# Patient Record
Sex: FEMALE | Race: WHITE | NOT HISPANIC OR LATINO | Employment: OTHER | URBAN - METROPOLITAN AREA
[De-identification: names, ages, dates, MRNs, and addresses within clinical notes are randomized per-mention and may not be internally consistent; named-entity substitution may affect disease eponyms.]

---

## 2017-06-08 ENCOUNTER — UNSCHEDULED FOLLOW UP (OUTPATIENT)
Dept: URBAN - METROPOLITAN AREA CLINIC 26 | Facility: CLINIC | Age: 62
End: 2017-06-08

## 2017-06-08 DIAGNOSIS — H35.341: ICD-10-CM

## 2017-06-08 DIAGNOSIS — H44.22: ICD-10-CM

## 2017-06-08 DIAGNOSIS — H43.813: ICD-10-CM

## 2017-06-08 DIAGNOSIS — H43.392: ICD-10-CM

## 2017-06-08 DIAGNOSIS — H35.3231: ICD-10-CM

## 2017-06-08 DIAGNOSIS — H33.301: ICD-10-CM

## 2017-06-08 PROCEDURE — 92235 FLUORESCEIN ANGRPH MLTIFRAME: CPT

## 2017-06-08 PROCEDURE — 92014 COMPRE OPH EXAM EST PT 1/>: CPT

## 2017-06-08 PROCEDURE — 92250 FUNDUS PHOTOGRAPHY W/I&R: CPT

## 2017-06-08 PROCEDURE — 67028 INJECTION EYE DRUG: CPT

## 2017-06-08 ASSESSMENT — TONOMETRY
OD_IOP_MMHG: 14
OS_IOP_MMHG: 16

## 2017-06-08 ASSESSMENT — VISUAL ACUITY
OS_CC: 20/25-1
OD_CC: 20/25

## 2017-06-12 ENCOUNTER — CLINIC PROCEDURE ONLY (OUTPATIENT)
Dept: URBAN - METROPOLITAN AREA CLINIC 26 | Facility: CLINIC | Age: 62
End: 2017-06-12

## 2017-06-12 DIAGNOSIS — H35.3231: ICD-10-CM

## 2017-06-12 PROCEDURE — 67028 INJECTION EYE DRUG: CPT

## 2017-06-12 ASSESSMENT — TONOMETRY: OS_IOP_MMHG: 15

## 2017-06-12 ASSESSMENT — VISUAL ACUITY: OS_CC: 20/30+2

## 2017-06-21 ENCOUNTER — UNSCHEDULED FOLLOW UP (OUTPATIENT)
Dept: URBAN - METROPOLITAN AREA CLINIC 26 | Facility: CLINIC | Age: 62
End: 2017-06-21

## 2017-06-21 VITALS — HEIGHT: 55 IN | HEART RATE: 68 BPM | DIASTOLIC BLOOD PRESSURE: 76 MMHG | SYSTOLIC BLOOD PRESSURE: 120 MMHG

## 2017-06-21 DIAGNOSIS — H44.22: ICD-10-CM

## 2017-06-21 DIAGNOSIS — H35.341: ICD-10-CM

## 2017-06-21 DIAGNOSIS — H35.3231: ICD-10-CM

## 2017-06-21 DIAGNOSIS — H33.301: ICD-10-CM

## 2017-06-21 DIAGNOSIS — H43.392: ICD-10-CM

## 2017-06-21 DIAGNOSIS — H43.813: ICD-10-CM

## 2017-06-21 PROCEDURE — 92226 OPHTHALMOSCOPY (SUB): CPT

## 2017-06-21 PROCEDURE — 92134 CPTRZ OPH DX IMG PST SGM RTA: CPT

## 2017-06-21 PROCEDURE — 92014 COMPRE OPH EXAM EST PT 1/>: CPT

## 2017-06-21 ASSESSMENT — VISUAL ACUITY
OS_CC: 20/30+2
OD_CC: 20/25-2

## 2017-06-21 ASSESSMENT — TONOMETRY
OS_IOP_MMHG: 13
OD_IOP_MMHG: 11

## 2017-07-21 ENCOUNTER — APPOINTMENT (RX ONLY)
Dept: URBAN - METROPOLITAN AREA CLINIC 116 | Facility: CLINIC | Age: 62
Setting detail: DERMATOLOGY
End: 2017-07-21

## 2017-07-21 DIAGNOSIS — L60.3 NAIL DYSTROPHY: ICD-10-CM

## 2017-07-21 DIAGNOSIS — L65.0 TELOGEN EFFLUVIUM: ICD-10-CM

## 2017-07-21 DIAGNOSIS — D18.0 HEMANGIOMA: ICD-10-CM

## 2017-07-21 DIAGNOSIS — D17 BENIGN LIPOMATOUS NEOPLASM: ICD-10-CM

## 2017-07-21 PROBLEM — D18.01 HEMANGIOMA OF SKIN AND SUBCUTANEOUS TISSUE: Status: ACTIVE | Noted: 2017-07-21

## 2017-07-21 PROBLEM — D17.24 BENIGN LIPOMATOUS NEOPLASM OF SKIN AND SUBCUTANEOUS TISSUE OF LEFT LEG: Status: ACTIVE | Noted: 2017-07-21

## 2017-07-21 PROCEDURE — ? TREATMENT REGIMEN

## 2017-07-21 PROCEDURE — 99214 OFFICE O/P EST MOD 30 MIN: CPT

## 2017-07-21 PROCEDURE — ? COUNSELING

## 2017-07-21 ASSESSMENT — LOCATION ZONE DERM
LOCATION ZONE: LEG
LOCATION ZONE: SCALP
LOCATION ZONE: TRUNK
LOCATION ZONE: FINGERNAIL

## 2017-07-21 ASSESSMENT — LOCATION SIMPLE DESCRIPTION DERM
LOCATION SIMPLE: LEFT INDEX FINGERNAIL
LOCATION SIMPLE: LEFT THIGH
LOCATION SIMPLE: RIGHT SMALL FINGERNAIL
LOCATION SIMPLE: ABDOMEN
LOCATION SIMPLE: LEFT SMALL FINGERNAIL
LOCATION SIMPLE: RIGHT INDEX FINGERNAIL
LOCATION SIMPLE: RIGHT RING FINGERNAIL
LOCATION SIMPLE: LEFT RING FINGERNAIL
LOCATION SIMPLE: RIGHT THUMBNAIL
LOCATION SIMPLE: LEFT THUMBNAIL
LOCATION SIMPLE: RIGHT SCALP
LOCATION SIMPLE: RIGHT MIDDLE FINGERNAIL
LOCATION SIMPLE: LEFT MIDDLE FINGERNAIL

## 2017-07-21 ASSESSMENT — LOCATION DETAILED DESCRIPTION DERM
LOCATION DETAILED: RIGHT THUMBNAIL
LOCATION DETAILED: LEFT MIDDLE FINGERNAIL
LOCATION DETAILED: LEFT RING FINGER LUNULA
LOCATION DETAILED: LEFT ANTERIOR PROXIMAL THIGH
LOCATION DETAILED: RIGHT INDEX FINGERNAIL
LOCATION DETAILED: RIGHT MEDIAL FRONTAL SCALP
LOCATION DETAILED: RIGHT SMALL FINGERNAIL
LOCATION DETAILED: LEFT SMALL FINGERNAIL
LOCATION DETAILED: PERIUMBILICAL SKIN
LOCATION DETAILED: RIGHT MIDDLE FINGERNAIL
LOCATION DETAILED: LEFT INDEX FINGERNAIL
LOCATION DETAILED: LEFT THUMBNAIL
LOCATION DETAILED: RIGHT RING FINGERNAIL

## 2017-07-21 NOTE — PROCEDURE: TREATMENT REGIMEN
Initiate Treatment: Start viviscal supplements as directed x3 months
Discontinue Regimen: Biotin
Detail Level: Zone
Initiate Treatment: Start aquaphor to nails daily.  Use clear nail polish

## 2017-08-15 ENCOUNTER — FOLLOW UP (OUTPATIENT)
Dept: URBAN - METROPOLITAN AREA CLINIC 26 | Facility: CLINIC | Age: 62
End: 2017-08-15

## 2017-08-15 VITALS
HEIGHT: 62 IN | SYSTOLIC BLOOD PRESSURE: 121 MMHG | WEIGHT: 143 LBS | DIASTOLIC BLOOD PRESSURE: 73 MMHG | HEART RATE: 71 BPM | BODY MASS INDEX: 26.31 KG/M2

## 2017-08-15 DIAGNOSIS — H44.22: ICD-10-CM

## 2017-08-15 DIAGNOSIS — H35.3231: ICD-10-CM

## 2017-08-15 DIAGNOSIS — H43.813: ICD-10-CM

## 2017-08-15 DIAGNOSIS — H43.392: ICD-10-CM

## 2017-08-15 DIAGNOSIS — H35.341: ICD-10-CM

## 2017-08-15 DIAGNOSIS — H33.301: ICD-10-CM

## 2017-08-15 PROCEDURE — 67028 INJECTION EYE DRUG: CPT

## 2017-08-15 PROCEDURE — 92014 COMPRE OPH EXAM EST PT 1/>: CPT

## 2017-08-15 PROCEDURE — 92235 FLUORESCEIN ANGRPH MLTIFRAME: CPT

## 2017-08-15 PROCEDURE — 92250 FUNDUS PHOTOGRAPHY W/I&R: CPT

## 2017-08-15 ASSESSMENT — VISUAL ACUITY
OS_CC: 20/25-2
OD_CC: 20/30+2

## 2017-08-15 ASSESSMENT — TONOMETRY
OS_IOP_MMHG: 15
OD_IOP_MMHG: 13

## 2017-08-22 ENCOUNTER — CLINIC PROCEDURE ONLY (OUTPATIENT)
Dept: URBAN - METROPOLITAN AREA CLINIC 26 | Facility: CLINIC | Age: 62
End: 2017-08-22

## 2017-08-22 DIAGNOSIS — H35.3231: ICD-10-CM

## 2017-08-22 PROCEDURE — 67028 INJECTION EYE DRUG: CPT

## 2017-08-22 ASSESSMENT — VISUAL ACUITY: OD_CC: 20/30-

## 2017-08-22 ASSESSMENT — TONOMETRY: OD_IOP_MMHG: 18

## 2017-11-15 ENCOUNTER — FOLLOW UP AND POST INJECTION EVALUATION (OUTPATIENT)
Dept: URBAN - METROPOLITAN AREA CLINIC 26 | Facility: CLINIC | Age: 62
End: 2017-11-15

## 2017-11-15 VITALS — DIASTOLIC BLOOD PRESSURE: 79 MMHG | HEIGHT: 55 IN | SYSTOLIC BLOOD PRESSURE: 124 MMHG | HEART RATE: 67 BPM

## 2017-11-15 DIAGNOSIS — H43.392: ICD-10-CM

## 2017-11-15 DIAGNOSIS — H35.341: ICD-10-CM

## 2017-11-15 DIAGNOSIS — H44.22: ICD-10-CM

## 2017-11-15 DIAGNOSIS — H35.3231: ICD-10-CM

## 2017-11-15 DIAGNOSIS — H33.301: ICD-10-CM

## 2017-11-15 DIAGNOSIS — H43.813: ICD-10-CM

## 2017-11-15 PROCEDURE — 92014 COMPRE OPH EXAM EST PT 1/>: CPT

## 2017-11-15 PROCEDURE — 92235 FLUORESCEIN ANGRPH MLTIFRAME: CPT

## 2017-11-15 PROCEDURE — 92250 FUNDUS PHOTOGRAPHY W/I&R: CPT

## 2017-11-15 PROCEDURE — 92134 CPTRZ OPH DX IMG PST SGM RTA: CPT

## 2017-11-15 ASSESSMENT — TONOMETRY
OS_IOP_MMHG: 18
OD_IOP_MMHG: 18

## 2017-11-15 ASSESSMENT — VISUAL ACUITY
OS_CC: 20/25-1
OD_CC: 20/25

## 2017-11-30 ENCOUNTER — UNSCHEDULED FOLLOW UP (OUTPATIENT)
Dept: URBAN - METROPOLITAN AREA CLINIC 26 | Facility: CLINIC | Age: 62
End: 2017-11-30

## 2017-11-30 DIAGNOSIS — H43.392: ICD-10-CM

## 2017-11-30 DIAGNOSIS — H35.341: ICD-10-CM

## 2017-11-30 DIAGNOSIS — H43.813: ICD-10-CM

## 2017-11-30 DIAGNOSIS — H10.13: ICD-10-CM

## 2017-11-30 DIAGNOSIS — H33.301: ICD-10-CM

## 2017-11-30 DIAGNOSIS — H35.3231: ICD-10-CM

## 2017-11-30 DIAGNOSIS — H44.22: ICD-10-CM

## 2017-11-30 DIAGNOSIS — H18.59: ICD-10-CM

## 2017-11-30 PROCEDURE — 92014 COMPRE OPH EXAM EST PT 1/>: CPT

## 2017-11-30 RX ORDER — TOBRAMYCIN AND DEXAMETHASONE 1; 3 MG/ML; MG/ML: 1 SUSPENSION/ DROPS OPHTHALMIC

## 2017-11-30 ASSESSMENT — VISUAL ACUITY
OD_CC: 20/20-
OS_CC: 20/20-1

## 2017-11-30 ASSESSMENT — TONOMETRY
OS_IOP_MMHG: 19
OD_IOP_MMHG: 18

## 2018-02-07 ENCOUNTER — FOLLOW UP (OUTPATIENT)
Dept: URBAN - METROPOLITAN AREA CLINIC 26 | Facility: CLINIC | Age: 63
End: 2018-02-07

## 2018-02-07 VITALS — HEIGHT: 55 IN | HEART RATE: 71 BPM | DIASTOLIC BLOOD PRESSURE: 70 MMHG | SYSTOLIC BLOOD PRESSURE: 117 MMHG

## 2018-02-07 DIAGNOSIS — H18.59: ICD-10-CM

## 2018-02-07 DIAGNOSIS — H43.392: ICD-10-CM

## 2018-02-07 DIAGNOSIS — H35.341: ICD-10-CM

## 2018-02-07 DIAGNOSIS — H35.3231: ICD-10-CM

## 2018-02-07 DIAGNOSIS — H43.813: ICD-10-CM

## 2018-02-07 DIAGNOSIS — H44.22: ICD-10-CM

## 2018-02-07 DIAGNOSIS — H04.123: ICD-10-CM

## 2018-02-07 DIAGNOSIS — H53.412: ICD-10-CM

## 2018-02-07 DIAGNOSIS — H33.301: ICD-10-CM

## 2018-02-07 PROCEDURE — 92014 COMPRE OPH EXAM EST PT 1/>: CPT

## 2018-02-07 PROCEDURE — 92235 FLUORESCEIN ANGRPH MLTIFRAME: CPT

## 2018-02-07 PROCEDURE — 92250 FUNDUS PHOTOGRAPHY W/I&R: CPT

## 2018-02-07 PROCEDURE — 92083 EXTENDED VISUAL FIELD XM: CPT

## 2018-02-07 ASSESSMENT — TONOMETRY
OD_IOP_MMHG: 15
OS_IOP_MMHG: 17

## 2018-02-07 ASSESSMENT — VISUAL ACUITY
OD_CC: 20/20-2
OS_CC: 20/20-1

## 2018-03-27 ENCOUNTER — RX ONLY (OUTPATIENT)
Age: 63
Setting detail: RX ONLY
End: 2018-03-27

## 2018-03-27 RX ORDER — TRETINOIN 0.5 MG/G
CREAM TOPICAL
Qty: 1 | Refills: 0 | Status: ERX

## 2018-05-02 ENCOUNTER — FOLLOW UP (OUTPATIENT)
Dept: URBAN - METROPOLITAN AREA CLINIC 26 | Facility: CLINIC | Age: 63
End: 2018-05-02

## 2018-05-02 VITALS
SYSTOLIC BLOOD PRESSURE: 108 MMHG | DIASTOLIC BLOOD PRESSURE: 70 MMHG | BODY MASS INDEX: 26.31 KG/M2 | WEIGHT: 143 LBS | HEIGHT: 62 IN | HEART RATE: 63 BPM

## 2018-05-02 DIAGNOSIS — H35.3231: ICD-10-CM

## 2018-05-02 DIAGNOSIS — H35.341: ICD-10-CM

## 2018-05-02 DIAGNOSIS — H44.22: ICD-10-CM

## 2018-05-02 DIAGNOSIS — H43.392: ICD-10-CM

## 2018-05-02 DIAGNOSIS — H53.412: ICD-10-CM

## 2018-05-02 DIAGNOSIS — H33.301: ICD-10-CM

## 2018-05-02 DIAGNOSIS — H43.813: ICD-10-CM

## 2018-05-02 DIAGNOSIS — H04.123: ICD-10-CM

## 2018-05-02 DIAGNOSIS — H18.59: ICD-10-CM

## 2018-05-02 PROCEDURE — 92250 FUNDUS PHOTOGRAPHY W/I&R: CPT

## 2018-05-02 PROCEDURE — 92235 FLUORESCEIN ANGRPH MLTIFRAME: CPT

## 2018-05-02 PROCEDURE — 92134 CPTRZ OPH DX IMG PST SGM RTA: CPT

## 2018-05-02 PROCEDURE — 92014 COMPRE OPH EXAM EST PT 1/>: CPT

## 2018-05-02 ASSESSMENT — TONOMETRY
OD_IOP_MMHG: 18
OS_IOP_MMHG: 19

## 2018-05-02 ASSESSMENT — VISUAL ACUITY
OS_CC: 20/20
OD_CC: 20/25+2

## 2018-07-25 ENCOUNTER — FOLLOW UP (OUTPATIENT)
Dept: URBAN - METROPOLITAN AREA CLINIC 26 | Facility: CLINIC | Age: 63
End: 2018-07-25

## 2018-07-25 VITALS — SYSTOLIC BLOOD PRESSURE: 120 MMHG | HEIGHT: 55 IN | DIASTOLIC BLOOD PRESSURE: 70 MMHG

## 2018-07-25 DIAGNOSIS — H35.341: ICD-10-CM

## 2018-07-25 DIAGNOSIS — H33.301: ICD-10-CM

## 2018-07-25 DIAGNOSIS — H53.412: ICD-10-CM

## 2018-07-25 DIAGNOSIS — H44.22: ICD-10-CM

## 2018-07-25 DIAGNOSIS — H43.392: ICD-10-CM

## 2018-07-25 DIAGNOSIS — H35.3231: ICD-10-CM

## 2018-07-25 DIAGNOSIS — H18.59: ICD-10-CM

## 2018-07-25 DIAGNOSIS — H43.813: ICD-10-CM

## 2018-07-25 PROCEDURE — 92134 CPTRZ OPH DX IMG PST SGM RTA: CPT

## 2018-07-25 PROCEDURE — 92014 COMPRE OPH EXAM EST PT 1/>: CPT

## 2018-07-25 PROCEDURE — 92250 FUNDUS PHOTOGRAPHY W/I&R: CPT

## 2018-07-25 PROCEDURE — 92235 FLUORESCEIN ANGRPH MLTIFRAME: CPT

## 2018-07-25 ASSESSMENT — VISUAL ACUITY
OD_CC: 20/20-2
OS_CC: 20/20-2

## 2018-07-25 ASSESSMENT — TONOMETRY
OS_IOP_MMHG: 20
OD_IOP_MMHG: 19

## 2018-11-01 ENCOUNTER — FOLLOW UP (OUTPATIENT)
Dept: URBAN - METROPOLITAN AREA CLINIC 26 | Facility: CLINIC | Age: 63
End: 2018-11-01

## 2018-11-01 DIAGNOSIS — H43.813: ICD-10-CM

## 2018-11-01 DIAGNOSIS — H43.392: ICD-10-CM

## 2018-11-01 DIAGNOSIS — H53.412: ICD-10-CM

## 2018-11-01 DIAGNOSIS — H44.22: ICD-10-CM

## 2018-11-01 DIAGNOSIS — H04.123: ICD-10-CM

## 2018-11-01 DIAGNOSIS — H18.59: ICD-10-CM

## 2018-11-01 DIAGNOSIS — H35.3231: ICD-10-CM

## 2018-11-01 DIAGNOSIS — H35.341: ICD-10-CM

## 2018-11-01 DIAGNOSIS — H33.301: ICD-10-CM

## 2018-11-01 DIAGNOSIS — H35.372: ICD-10-CM

## 2018-11-01 PROCEDURE — 92014 COMPRE OPH EXAM EST PT 1/>: CPT

## 2018-11-01 PROCEDURE — 92134 CPTRZ OPH DX IMG PST SGM RTA: CPT

## 2018-11-01 PROCEDURE — 92250 FUNDUS PHOTOGRAPHY W/I&R: CPT

## 2018-11-01 ASSESSMENT — VISUAL ACUITY
OD_CC: 20/20-1
OS_CC: 20/20

## 2018-11-01 ASSESSMENT — TONOMETRY
OS_IOP_MMHG: 16
OD_IOP_MMHG: 17

## 2019-02-06 ENCOUNTER — FOLLOW UP (OUTPATIENT)
Dept: URBAN - METROPOLITAN AREA CLINIC 26 | Facility: CLINIC | Age: 64
End: 2019-02-06

## 2019-02-06 VITALS — HEIGHT: 62 IN | WEIGHT: 145 LBS | BODY MASS INDEX: 26.68 KG/M2

## 2019-02-06 DIAGNOSIS — H04.123: ICD-10-CM

## 2019-02-06 DIAGNOSIS — H02.836: ICD-10-CM

## 2019-02-06 DIAGNOSIS — H33.301: ICD-10-CM

## 2019-02-06 DIAGNOSIS — H18.59: ICD-10-CM

## 2019-02-06 DIAGNOSIS — H01.003: ICD-10-CM

## 2019-02-06 DIAGNOSIS — H35.372: ICD-10-CM

## 2019-02-06 DIAGNOSIS — H53.412: ICD-10-CM

## 2019-02-06 DIAGNOSIS — H43.392: ICD-10-CM

## 2019-02-06 DIAGNOSIS — H02.833: ICD-10-CM

## 2019-02-06 DIAGNOSIS — H43.813: ICD-10-CM

## 2019-02-06 DIAGNOSIS — H35.341: ICD-10-CM

## 2019-02-06 DIAGNOSIS — H44.22: ICD-10-CM

## 2019-02-06 DIAGNOSIS — H35.3231: ICD-10-CM

## 2019-02-06 DIAGNOSIS — H01.006: ICD-10-CM

## 2019-02-06 PROCEDURE — 92134 CPTRZ OPH DX IMG PST SGM RTA: CPT

## 2019-02-06 PROCEDURE — 92250 FUNDUS PHOTOGRAPHY W/I&R: CPT

## 2019-02-06 PROCEDURE — 92014 COMPRE OPH EXAM EST PT 1/>: CPT

## 2019-02-06 ASSESSMENT — TONOMETRY
OD_IOP_MMHG: 14
OS_IOP_MMHG: 13

## 2019-02-06 ASSESSMENT — VISUAL ACUITY
OS_CC: 20/25-2
OD_CC: 20/30-2

## 2019-05-15 ENCOUNTER — FOLLOW UP (OUTPATIENT)
Dept: URBAN - METROPOLITAN AREA CLINIC 26 | Facility: CLINIC | Age: 64
End: 2019-05-15

## 2019-05-15 DIAGNOSIS — H53.412: ICD-10-CM

## 2019-05-15 DIAGNOSIS — H18.59: ICD-10-CM

## 2019-05-15 DIAGNOSIS — H43.392: ICD-10-CM

## 2019-05-15 DIAGNOSIS — H02.833: ICD-10-CM

## 2019-05-15 DIAGNOSIS — H43.813: ICD-10-CM

## 2019-05-15 DIAGNOSIS — H35.3231: ICD-10-CM

## 2019-05-15 DIAGNOSIS — H35.341: ICD-10-CM

## 2019-05-15 DIAGNOSIS — H01.006: ICD-10-CM

## 2019-05-15 DIAGNOSIS — H33.301: ICD-10-CM

## 2019-05-15 DIAGNOSIS — H02.836: ICD-10-CM

## 2019-05-15 DIAGNOSIS — H01.003: ICD-10-CM

## 2019-05-15 DIAGNOSIS — H44.22: ICD-10-CM

## 2019-05-15 DIAGNOSIS — H04.123: ICD-10-CM

## 2019-05-15 DIAGNOSIS — H35.372: ICD-10-CM

## 2019-05-15 PROCEDURE — 92250 FUNDUS PHOTOGRAPHY W/I&R: CPT

## 2019-05-15 PROCEDURE — 92014 COMPRE OPH EXAM EST PT 1/>: CPT

## 2019-05-15 PROCEDURE — 92134 CPTRZ OPH DX IMG PST SGM RTA: CPT

## 2019-05-15 ASSESSMENT — VISUAL ACUITY
OD_CC: 20/20-1
OS_CC: 20/20

## 2019-05-15 ASSESSMENT — TONOMETRY
OD_IOP_MMHG: 17
OS_IOP_MMHG: 19

## 2019-07-08 ENCOUNTER — APPOINTMENT (RX ONLY)
Dept: URBAN - METROPOLITAN AREA CLINIC 116 | Facility: CLINIC | Age: 64
Setting detail: DERMATOLOGY
End: 2019-07-08

## 2019-07-08 DIAGNOSIS — D485 NEOPLASM OF UNCERTAIN BEHAVIOR OF SKIN: ICD-10-CM

## 2019-07-08 DIAGNOSIS — D17 BENIGN LIPOMATOUS NEOPLASM: ICD-10-CM

## 2019-07-08 DIAGNOSIS — L90.8 OTHER ATROPHIC DISORDERS OF SKIN: ICD-10-CM

## 2019-07-08 DIAGNOSIS — L81.4 OTHER MELANIN HYPERPIGMENTATION: ICD-10-CM

## 2019-07-08 DIAGNOSIS — Z71.89 OTHER SPECIFIED COUNSELING: ICD-10-CM

## 2019-07-08 DIAGNOSIS — L82.1 OTHER SEBORRHEIC KERATOSIS: ICD-10-CM

## 2019-07-08 DIAGNOSIS — Z41.9 ENCOUNTER FOR PROCEDURE FOR PURPOSES OTHER THAN REMEDYING HEALTH STATE, UNSPECIFIED: ICD-10-CM

## 2019-07-08 PROBLEM — D48.5 NEOPLASM OF UNCERTAIN BEHAVIOR OF SKIN: Status: ACTIVE | Noted: 2019-07-08

## 2019-07-08 PROBLEM — D17.1 BENIGN LIPOMATOUS NEOPLASM OF SKIN AND SUBCUTANEOUS TISSUE OF TRUNK: Status: ACTIVE | Noted: 2019-07-08

## 2019-07-08 PROCEDURE — ? OTHER (COSMETIC)

## 2019-07-08 PROCEDURE — ? COSMETIC CONSULTATION: FILLERS

## 2019-07-08 PROCEDURE — ? BIOPSY BY SHAVE METHOD

## 2019-07-08 PROCEDURE — ? RECOMMENDATIONS

## 2019-07-08 PROCEDURE — ? COSMETIC CONSULTATION: BOTULINUM TOXIN

## 2019-07-08 PROCEDURE — ? PRESCRIPTION

## 2019-07-08 PROCEDURE — 99214 OFFICE O/P EST MOD 30 MIN: CPT | Mod: 25

## 2019-07-08 PROCEDURE — 11102 TANGNTL BX SKIN SINGLE LES: CPT

## 2019-07-08 PROCEDURE — ? COUNSELING

## 2019-07-08 RX ORDER — TRETINOIN 1 MG/G
CREAM TOPICAL
Qty: 1 | Refills: 2 | Status: ERX | COMMUNITY
Start: 2019-07-08

## 2019-07-08 RX ADMIN — TRETINOIN: 1 CREAM TOPICAL at 19:44

## 2019-07-08 ASSESSMENT — LOCATION DETAILED DESCRIPTION DERM
LOCATION DETAILED: RIGHT INFERIOR MEDIAL UPPER BACK
LOCATION DETAILED: SUPERIOR THORACIC SPINE
LOCATION DETAILED: LEFT MEDIAL BREAST 10-11:00 REGION
LOCATION DETAILED: RIGHT MEDIAL SUPERIOR CHEST

## 2019-07-08 ASSESSMENT — LOCATION ZONE DERM: LOCATION ZONE: TRUNK

## 2019-07-08 ASSESSMENT — LOCATION SIMPLE DESCRIPTION DERM
LOCATION SIMPLE: CHEST
LOCATION SIMPLE: LEFT BREAST
LOCATION SIMPLE: RIGHT UPPER BACK
LOCATION SIMPLE: UPPER BACK

## 2019-07-08 NOTE — PROCEDURE: BIOPSY BY SHAVE METHOD
Bill For Surgical Tray: no
Consent: The provider's intent is to obtain a tissue sample solely for diagnostic purposes. Written consent to obtain tissue sample was obtained and risks were reviewed including but not limited to scarring, infection, bleeding, scabbing, incomplete removal, nerve damage and allergy to anesthesia.
Hemostasis: Aluminum Chloride
Silver Nitrate Text: The wound bed was treated with silver nitrate after the biopsy was performed.
Render Post-Care Instructions In Note?: yes
Anesthesia Type: 1% lidocaine with epinephrine
Size Of Lesion In Cm: 0.3
Additional Anesthesia Volume In Cc (Will Not Render If 0): 0
Lab: [id number] [facility] [address]
Depth Of Biopsy: dermis
Cryotherapy Text: The wound bed was treated with cryotherapy after the biopsy was performed.
Biopsy Type: H and E
Wound Care: Vaseline
Billing Type: [employer]
Anesthesia Volume In Cc (Will Not Render If 0): 1
Post-Care Instructions: Clean the wound with antibacterial soap and water twice a day. If crust develops, soak with moist gauze. Apply a thin layer of Vaseline to the area twice a day until wound is healed. Cover with a clean band-aid\\nBleeding is rare, but if it should occur, apply direct pressure to the bleeding site for a full 15 minutes without easing up. If the bleeding continues, despite your efforts, please call the office. If it is after hours, call [phone] to speak to a provider.   It may be necessary to go to the emergency room
Electrodesiccation Text: The wound bed was treated with electrodesiccation after the biopsy was performed.
Detail Level: Detailed
Type Of Destruction Used: Curettage
Path Notes (To The Dermatopathologist): 3 mm
Dressing: Band-Aid
Biopsy Method: Personna blade
Notification Instructions: Patient will be notified of biopsy results. However, patient instructed to call the office if not contacted within 2 weeks.
Electrodesiccation And Curettage Text: The wound bed was treated with electrodesiccation and curettage after the biopsy was performed.

## 2019-07-08 NOTE — PROCEDURE: OTHER (COSMETIC)
Detail Level: Detailed
Other (Free Text): Quoted patient for:\\n1 Radiesse and 1 Lyft to cheeks OR 2 Volumas to cheeks and 20 units of Botox to Glabella. Informed patient of VIP pricing.

## 2019-07-12 ENCOUNTER — APPOINTMENT (RX ONLY)
Dept: URBAN - METROPOLITAN AREA CLINIC 116 | Facility: CLINIC | Age: 64
Setting detail: DERMATOLOGY
End: 2019-07-12

## 2019-07-12 DIAGNOSIS — Z41.9 ENCOUNTER FOR PROCEDURE FOR PURPOSES OTHER THAN REMEDYING HEALTH STATE, UNSPECIFIED: ICD-10-CM

## 2019-07-12 PROCEDURE — ? BOTOX

## 2019-07-12 PROCEDURE — ? ADDITIONAL NOTES

## 2019-07-12 ASSESSMENT — LOCATION SIMPLE DESCRIPTION DERM
LOCATION SIMPLE: GLABELLA
LOCATION SIMPLE: RIGHT FOREHEAD
LOCATION SIMPLE: LEFT FOREHEAD

## 2019-07-12 ASSESSMENT — LOCATION DETAILED DESCRIPTION DERM
LOCATION DETAILED: RIGHT SUPERIOR FOREHEAD
LOCATION DETAILED: RIGHT INFERIOR FOREHEAD
LOCATION DETAILED: GLABELLA
LOCATION DETAILED: LEFT SUPERIOR FOREHEAD
LOCATION DETAILED: LEFT INFERIOR MEDIAL FOREHEAD

## 2019-07-12 ASSESSMENT — LOCATION ZONE DERM: LOCATION ZONE: FACE

## 2019-07-12 NOTE — PROCEDURE: BOTOX
Consent: Written consent obtained. Risks include but not limited to lid/brow ptosis, bruising, swelling, diplopia, temporary effect, incomplete chemical denervation.
Additional Area 1 Units: 0
Post-Care Instructions: Patient instructed to not lie down for 4 hours and limit physical activity for 24 hours. Patient instructed not to travel by airplane for 48 hours.
Glabellar Complex Units: [address] [facility]
Lot #: [id number]
Forehead Units: 3
Dilution (U/0.1 Cc): 1
Expiration Date (Month Year): 
Detail Level: Detailed
Price (Use Numbers Only, No Special Characters Or $): 25 units

## 2019-07-16 ENCOUNTER — APPOINTMENT (RX ONLY)
Dept: URBAN - METROPOLITAN AREA CLINIC 128 | Facility: CLINIC | Age: 64
Setting detail: DERMATOLOGY
End: 2019-07-16

## 2019-07-16 DIAGNOSIS — Z41.9 ENCOUNTER FOR PROCEDURE FOR PURPOSES OTHER THAN REMEDYING HEALTH STATE, UNSPECIFIED: ICD-10-CM

## 2019-07-16 PROCEDURE — ? FILLERS

## 2019-07-16 ASSESSMENT — LOCATION SIMPLE DESCRIPTION DERM
LOCATION SIMPLE: RIGHT ZYGOMA
LOCATION SIMPLE: LEFT ZYGOMA
LOCATION SIMPLE: RIGHT LIP
LOCATION SIMPLE: RIGHT CHEEK
LOCATION SIMPLE: LEFT CHEEK
LOCATION SIMPLE: LEFT LIP

## 2019-07-16 ASSESSMENT — LOCATION ZONE DERM
LOCATION ZONE: FACE
LOCATION ZONE: LIP

## 2019-07-16 ASSESSMENT — LOCATION DETAILED DESCRIPTION DERM
LOCATION DETAILED: LEFT NASOLABIAL FOLD
LOCATION DETAILED: LEFT CENTRAL ZYGOMA
LOCATION DETAILED: LEFT INFERIOR MEDIAL MALAR CHEEK
LOCATION DETAILED: LEFT CENTRAL MALAR CHEEK
LOCATION DETAILED: RIGHT INFERIOR MEDIAL BUCCAL CHEEK
LOCATION DETAILED: RIGHT LOWER CUTANEOUS LIP
LOCATION DETAILED: RIGHT CENTRAL ZYGOMA
LOCATION DETAILED: LEFT SUPERIOR MEDIAL BUCCAL CHEEK
LOCATION DETAILED: LEFT SUPERIOR CENTRAL BUCCAL CHEEK
LOCATION DETAILED: LEFT LOWER CUTANEOUS LIP
LOCATION DETAILED: RIGHT LATERAL MALAR CHEEK
LOCATION DETAILED: RIGHT INFERIOR MEDIAL MALAR CHEEK
LOCATION DETAILED: RIGHT CENTRAL BUCCAL CHEEK
LOCATION DETAILED: RIGHT CENTRAL MALAR CHEEK

## 2019-07-16 NOTE — PROCEDURE: FILLERS
Cheeks Filler Volume In Cc: 0
Filler Comments: x 1 syringe
Consent: Written consent obtained. Risks include but not limited to bruising, beading, irregular texture, ulceration, infection, allergic reaction, scar formation, incomplete augmentation, temporary nature, procedural pain.
Expiration Date (Month Year): 11-30-20
Filler: Beck Rodríguez
Filler: Restylane Defyne
Post-Care Instructions: Patient instructed to apply ice to reduce swelling.
Lateral Face Filler  Volume In Cc: 1
Include Cannula Information In Note?: No
Lot #: [id number]
Additional Anesthesia Volume In Cc: 6
Nasolabial Folds Filler Volume In Cc: 0.5
Lot #: [id number]
Lot #: [id number]
Lot #: [id number] [facility]
Expiration Date (Month Year): 06/2021
Expiration Date (Month Year): 02-01-20
Expiration Date (Month Year): 09/2020
Lot #: [id number]
Detail Level: Simple
Anesthesia Lot #: [id number]
Expiration Date (Month Year): 08-31-20
Anesthesia Expiration Date (Month Year): 10/2019
Price (Use Numbers Only, No Special Characters Or $): 0.0
Filler Comments: x2 syringes
Filler: Voluma
Map Statment: See [address] for Complete Details
Lot #: [id number]

## 2019-08-28 ENCOUNTER — FOLLOW UP (OUTPATIENT)
Dept: URBAN - METROPOLITAN AREA CLINIC 26 | Facility: CLINIC | Age: 64
End: 2019-08-28

## 2019-08-28 DIAGNOSIS — H33.301: ICD-10-CM

## 2019-08-28 DIAGNOSIS — H44.22: ICD-10-CM

## 2019-08-28 DIAGNOSIS — H35.372: ICD-10-CM

## 2019-08-28 DIAGNOSIS — H43.813: ICD-10-CM

## 2019-08-28 DIAGNOSIS — H43.392: ICD-10-CM

## 2019-08-28 DIAGNOSIS — H53.412: ICD-10-CM

## 2019-08-28 DIAGNOSIS — H35.341: ICD-10-CM

## 2019-08-28 DIAGNOSIS — H35.3231: ICD-10-CM

## 2019-08-28 DIAGNOSIS — H18.59: ICD-10-CM

## 2019-08-28 PROCEDURE — 92250 FUNDUS PHOTOGRAPHY W/I&R: CPT

## 2019-08-28 PROCEDURE — 92134 CPTRZ OPH DX IMG PST SGM RTA: CPT

## 2019-08-28 PROCEDURE — 92014 COMPRE OPH EXAM EST PT 1/>: CPT

## 2019-08-28 ASSESSMENT — TONOMETRY
OS_IOP_MMHG: 13
OD_IOP_MMHG: 13

## 2019-08-28 ASSESSMENT — VISUAL ACUITY
OD_CC: 20/20-2
OS_CC: 20/20-1

## 2020-05-12 ENCOUNTER — FOLLOW UP (OUTPATIENT)
Dept: URBAN - METROPOLITAN AREA CLINIC 26 | Facility: CLINIC | Age: 65
End: 2020-05-12

## 2020-05-12 VITALS
BODY MASS INDEX: 25.4 KG/M2 | SYSTOLIC BLOOD PRESSURE: 115 MMHG | HEART RATE: 61 BPM | HEIGHT: 62 IN | WEIGHT: 138 LBS | DIASTOLIC BLOOD PRESSURE: 72 MMHG

## 2020-05-12 DIAGNOSIS — H35.341: ICD-10-CM

## 2020-05-12 DIAGNOSIS — H33.301: ICD-10-CM

## 2020-05-12 DIAGNOSIS — H04.123: ICD-10-CM

## 2020-05-12 DIAGNOSIS — H35.3231: ICD-10-CM

## 2020-05-12 DIAGNOSIS — H43.392: ICD-10-CM

## 2020-05-12 DIAGNOSIS — H53.412: ICD-10-CM

## 2020-05-12 DIAGNOSIS — H02.836: ICD-10-CM

## 2020-05-12 DIAGNOSIS — H02.833: ICD-10-CM

## 2020-05-12 DIAGNOSIS — H18.59: ICD-10-CM

## 2020-05-12 DIAGNOSIS — H01.003: ICD-10-CM

## 2020-05-12 DIAGNOSIS — H35.372: ICD-10-CM

## 2020-05-12 DIAGNOSIS — H43.813: ICD-10-CM

## 2020-05-12 DIAGNOSIS — H01.006: ICD-10-CM

## 2020-05-12 DIAGNOSIS — H44.22: ICD-10-CM

## 2020-05-12 PROCEDURE — 92250 FUNDUS PHOTOGRAPHY W/I&R: CPT

## 2020-05-12 PROCEDURE — 92134 CPTRZ OPH DX IMG PST SGM RTA: CPT

## 2020-05-12 PROCEDURE — 92014 COMPRE OPH EXAM EST PT 1/>: CPT

## 2020-05-12 PROCEDURE — 92235 FLUORESCEIN ANGRPH MLTIFRAME: CPT

## 2020-05-12 ASSESSMENT — TONOMETRY
OD_IOP_MMHG: 15
OS_IOP_MMHG: 17

## 2020-05-12 ASSESSMENT — VISUAL ACUITY
OD_CC: 20/20-2
OS_CC: 20/20-2

## 2020-07-13 ENCOUNTER — APPOINTMENT (RX ONLY)
Dept: URBAN - METROPOLITAN AREA CLINIC 116 | Facility: CLINIC | Age: 65
Setting detail: DERMATOLOGY
End: 2020-07-13

## 2020-07-13 DIAGNOSIS — L90.0 LICHEN SCLEROSUS ET ATROPHICUS: ICD-10-CM

## 2020-07-13 DIAGNOSIS — L60.8 OTHER NAIL DISORDERS: ICD-10-CM

## 2020-07-13 DIAGNOSIS — D485 NEOPLASM OF UNCERTAIN BEHAVIOR OF SKIN: ICD-10-CM

## 2020-07-13 PROBLEM — D48.5 NEOPLASM OF UNCERTAIN BEHAVIOR OF SKIN: Status: ACTIVE | Noted: 2020-07-13

## 2020-07-13 PROCEDURE — 99214 OFFICE O/P EST MOD 30 MIN: CPT | Mod: 25

## 2020-07-13 PROCEDURE — 11102 TANGNTL BX SKIN SINGLE LES: CPT

## 2020-07-13 PROCEDURE — ? PRESCRIPTION

## 2020-07-13 PROCEDURE — ? REFERRAL

## 2020-07-13 PROCEDURE — ? BIOPSY BY SHAVE METHOD

## 2020-07-13 PROCEDURE — ? DEFER

## 2020-07-13 PROCEDURE — ? COUNSELING

## 2020-07-13 RX ORDER — CLOBETASOL PROPIONATE 0.5 MG/G
OINTMENT TOPICAL BID
Qty: 1 | Refills: 3 | Status: ERX | COMMUNITY
Start: 2020-07-13

## 2020-07-13 RX ADMIN — CLOBETASOL PROPIONATE: 0.5 OINTMENT TOPICAL at 00:00

## 2020-07-13 ASSESSMENT — LOCATION SIMPLE DESCRIPTION DERM
LOCATION SIMPLE: LEFT THUMBNAIL
LOCATION SIMPLE: LEFT THUMBNAIL
LOCATION SIMPLE: VULVA
LOCATION SIMPLE: SCALP

## 2020-07-13 ASSESSMENT — LOCATION DETAILED DESCRIPTION DERM
LOCATION DETAILED: LEFT THUMBNAIL
LOCATION DETAILED: LEFT LABIA MINORA
LOCATION DETAILED: RIGHT SUPERIOR PARIETAL SCALP
LOCATION DETAILED: LEFT THUMBNAIL

## 2020-07-13 ASSESSMENT — LOCATION ZONE DERM
LOCATION ZONE: SCALP
LOCATION ZONE: FINGERNAIL
LOCATION ZONE: VULVA
LOCATION ZONE: FINGERNAIL

## 2020-07-13 NOTE — PROCEDURE: BIOPSY BY SHAVE METHOD
Detail Level: Detailed
Depth Of Biopsy: dermis
Was A Bandage Applied: Yes
Size Of Lesion In Cm: 0.5
X Size Of Lesion In Cm: 0
Biopsy Type: H and E
Biopsy Method: Personna blade
Anesthesia Type: 1% lidocaine with epinephrine and a 1:10 solution of 8.4% sodium bicarbonate
Anesthesia Volume In Cc (Will Not Render If 0): 1
Hemostasis: Aluminum Chloride
Wound Care: Vaseline
Dressing: pressure dressing with telfa
Destruction After The Procedure: No
Type Of Destruction Used: Curettage
Cryotherapy Text: The wound bed was treated with cryotherapy after the biopsy was performed.
Electrodesiccation Text: The wound bed was treated with electrodesiccation after the biopsy was performed.
Electrodesiccation And Curettage Text: The wound bed was treated with electrodesiccation and curettage after the biopsy was performed.
Silver Nitrate Text: The wound bed was treated with silver nitrate after the biopsy was performed.
Lab: [address] [facility]
Consent: The provider's intent is to obtain a tissue sample solely for diagnostic purposes. Written consent to obtain tissue sample was obtained and risks were reviewed including but not limited to scarring, infection, bleeding, scabbing, incomplete removal, nerve damage and allergy to anesthesia.
Post-Care Instructions: Clean the wound with antibacterial soap and water twice a day. If crust develops, soak with moist gauze. Apply a thin layer of Vaseline to the area twice a day until wound is healed. Cover with a clean band-aid\\nBleeding is rare, but if it should occur, apply direct pressure to the bleeding site for a full 15 minutes without easing up. If the bleeding continues, despite your efforts, please call the office. If it is after hours, call [phone] to speak to a provider.   It may be necessary to go to the emergency room
Notification Instructions: Patient will be notified of biopsy results. However, patient instructed to call the office if not contacted within 2 weeks.
Billing Type: [employer]
Information: Selecting Yes will display possible errors in your note based on the variables you have selected. This validation is only offered as a suggestion for you. PLEASE NOTE THAT THE VALIDATION TEXT WILL BE REMOVED WHEN YOU FINALIZE YOUR NOTE. IF YOU WANT TO FAX A PRELIMINARY NOTE YOU WILL NEED TO TOGGLE THIS TO 'NO' IF YOU DO NOT WANT IT IN YOUR FAXED NOTE.

## 2020-07-13 NOTE — PROCEDURE: DEFER
Detail Level: Simple
Introduction Text (Please End With A Colon): The following procedure was deferred:
Instructions (Optional): Nail biopsy Dr. Mccabe Poisson

## 2020-07-27 ENCOUNTER — RX ONLY (OUTPATIENT)
Age: 65
Setting detail: RX ONLY
End: 2020-07-27

## 2020-07-27 RX ORDER — CLINDAMYCIN PHOSPHATE 10 MG/ML
LOTION TOPICAL
Qty: 1 | Refills: 0 | Status: ERX | COMMUNITY
Start: 2020-07-27

## 2020-11-10 ENCOUNTER — FOLLOW UP (OUTPATIENT)
Dept: URBAN - METROPOLITAN AREA CLINIC 26 | Facility: CLINIC | Age: 65
End: 2020-11-10

## 2020-11-10 DIAGNOSIS — H35.372: ICD-10-CM

## 2020-11-10 DIAGNOSIS — H35.3231: ICD-10-CM

## 2020-11-10 DIAGNOSIS — H44.22: ICD-10-CM

## 2020-11-10 DIAGNOSIS — H43.392: ICD-10-CM

## 2020-11-10 DIAGNOSIS — H01.003: ICD-10-CM

## 2020-11-10 DIAGNOSIS — H53.412: ICD-10-CM

## 2020-11-10 DIAGNOSIS — H04.123: ICD-10-CM

## 2020-11-10 DIAGNOSIS — H43.813: ICD-10-CM

## 2020-11-10 DIAGNOSIS — H02.836: ICD-10-CM

## 2020-11-10 DIAGNOSIS — H33.301: ICD-10-CM

## 2020-11-10 DIAGNOSIS — H01.006: ICD-10-CM

## 2020-11-10 DIAGNOSIS — H18.599: ICD-10-CM

## 2020-11-10 DIAGNOSIS — H35.341: ICD-10-CM

## 2020-11-10 DIAGNOSIS — H02.833: ICD-10-CM

## 2020-11-10 PROCEDURE — 92014 COMPRE OPH EXAM EST PT 1/>: CPT

## 2020-11-10 PROCEDURE — 92250 FUNDUS PHOTOGRAPHY W/I&R: CPT

## 2020-11-10 PROCEDURE — 92134 CPTRZ OPH DX IMG PST SGM RTA: CPT

## 2020-11-10 ASSESSMENT — VISUAL ACUITY
OS_CC: 20/20-1
OD_CC: 20/20-2

## 2020-11-10 ASSESSMENT — TONOMETRY
OS_IOP_MMHG: 16
OD_IOP_MMHG: 14

## 2021-05-11 ENCOUNTER — FOLLOW UP (OUTPATIENT)
Dept: URBAN - METROPOLITAN AREA CLINIC 26 | Facility: CLINIC | Age: 66
End: 2021-05-11

## 2021-05-11 VITALS — WEIGHT: 145 LBS | BODY MASS INDEX: 26.68 KG/M2 | HEIGHT: 62 IN

## 2021-05-11 DIAGNOSIS — H33.301: ICD-10-CM

## 2021-05-11 DIAGNOSIS — H35.3231: ICD-10-CM

## 2021-05-11 DIAGNOSIS — H43.813: ICD-10-CM

## 2021-05-11 DIAGNOSIS — H35.341: ICD-10-CM

## 2021-05-11 DIAGNOSIS — H18.599: ICD-10-CM

## 2021-05-11 DIAGNOSIS — H35.372: ICD-10-CM

## 2021-05-11 DIAGNOSIS — H44.22: ICD-10-CM

## 2021-05-11 DIAGNOSIS — H53.412: ICD-10-CM

## 2021-05-11 DIAGNOSIS — H43.392: ICD-10-CM

## 2021-05-11 PROCEDURE — 92014 COMPRE OPH EXAM EST PT 1/>: CPT

## 2021-05-11 PROCEDURE — 92250 FUNDUS PHOTOGRAPHY W/I&R: CPT

## 2021-05-11 PROCEDURE — 92134 CPTRZ OPH DX IMG PST SGM RTA: CPT

## 2021-05-11 ASSESSMENT — TONOMETRY
OS_IOP_MMHG: 14
OD_IOP_MMHG: 13

## 2021-05-11 ASSESSMENT — VISUAL ACUITY
OD_CC: 20/20-2
OS_CC: 20/20-1

## 2021-08-25 NOTE — PATIENT DISCUSSION
David Visual Field 36 point screen: I have reviewed the visual fields both taped and untaped on this patient which demonstrate significant obstruction of the patient's peripheral visual field on both eyes.

## 2021-11-30 ENCOUNTER — FOLLOW UP (OUTPATIENT)
Dept: URBAN - METROPOLITAN AREA CLINIC 26 | Facility: CLINIC | Age: 66
End: 2021-11-30

## 2021-11-30 VITALS — WEIGHT: 145 LBS | HEIGHT: 62 IN | BODY MASS INDEX: 26.68 KG/M2

## 2021-11-30 DIAGNOSIS — H44.22: ICD-10-CM

## 2021-11-30 DIAGNOSIS — H43.813: ICD-10-CM

## 2021-11-30 DIAGNOSIS — H35.3211: ICD-10-CM

## 2021-11-30 DIAGNOSIS — H35.341: ICD-10-CM

## 2021-11-30 DIAGNOSIS — H53.412: ICD-10-CM

## 2021-11-30 DIAGNOSIS — H18.599: ICD-10-CM

## 2021-11-30 DIAGNOSIS — H35.3221: ICD-10-CM

## 2021-11-30 DIAGNOSIS — H35.372: ICD-10-CM

## 2021-11-30 DIAGNOSIS — H43.392: ICD-10-CM

## 2021-11-30 DIAGNOSIS — H33.301: ICD-10-CM

## 2021-11-30 DIAGNOSIS — H04.123: ICD-10-CM

## 2021-11-30 PROCEDURE — 92134 CPTRZ OPH DX IMG PST SGM RTA: CPT

## 2021-11-30 PROCEDURE — 92250 FUNDUS PHOTOGRAPHY W/I&R: CPT

## 2021-11-30 PROCEDURE — 92014 COMPRE OPH EXAM EST PT 1/>: CPT

## 2021-11-30 ASSESSMENT — VISUAL ACUITY
OD_CC: 20/25-2
OS_CC: 20/25+2

## 2021-11-30 ASSESSMENT — TONOMETRY
OD_IOP_MMHG: 15
OS_IOP_MMHG: 18

## 2022-05-24 ENCOUNTER — FOLLOW UP (OUTPATIENT)
Dept: URBAN - METROPOLITAN AREA CLINIC 26 | Facility: CLINIC | Age: 67
End: 2022-05-24

## 2022-05-24 VITALS — BODY MASS INDEX: 25.03 KG/M2 | WEIGHT: 136 LBS | HEIGHT: 62 IN

## 2022-05-24 DIAGNOSIS — H35.372: ICD-10-CM

## 2022-05-24 DIAGNOSIS — H33.301: ICD-10-CM

## 2022-05-24 DIAGNOSIS — H44.22: ICD-10-CM

## 2022-05-24 DIAGNOSIS — H04.123: ICD-10-CM

## 2022-05-24 DIAGNOSIS — H43.813: ICD-10-CM

## 2022-05-24 DIAGNOSIS — H35.3231: ICD-10-CM

## 2022-05-24 DIAGNOSIS — H35.341: ICD-10-CM

## 2022-05-24 DIAGNOSIS — H53.412: ICD-10-CM

## 2022-05-24 DIAGNOSIS — H43.392: ICD-10-CM

## 2022-05-24 DIAGNOSIS — H18.599: ICD-10-CM

## 2022-05-24 PROCEDURE — 92134 CPTRZ OPH DX IMG PST SGM RTA: CPT

## 2022-05-24 PROCEDURE — 92250 FUNDUS PHOTOGRAPHY W/I&R: CPT

## 2022-05-24 PROCEDURE — 92014 COMPRE OPH EXAM EST PT 1/>: CPT

## 2022-05-24 ASSESSMENT — VISUAL ACUITY
OD_CC: 20/25+1
OS_CC: 20/20-1

## 2022-05-24 ASSESSMENT — TONOMETRY
OS_IOP_MMHG: 16
OD_IOP_MMHG: 15

## 2022-07-22 NOTE — PROCEDURE NOTE: CLINICAL
PROCEDURE NOTE: Dilation of Lacrimal Punctum, With or Without Irrigation OD. Diagnosis: Epiphora Due to Insufficient Drainage. Prior to treatment, the risks/benefits/alternatives were discussed. The patient wished to proceed with procedure. The punctum was dilated with a punctum dilator. A syringe and cannula were used to irrigate water into the canaliculus. Patient tolerated procedure well. There were no complications. Post op instructions given. Dima Morris

## 2022-07-22 NOTE — PATIENT DISCUSSION
After careful examination today there was no regurgitation upon compression of the lacrimal sac.  Patient irrigated easily through both the superior and inferior systems today.  No evidence of NLDO or canaliculitis.  Suspect possible blepharoconjunctivitis.  Recommend continued use of drops prescribed by Dr. Jacob Munoz Patient expressed understanding.  Follow up in one month for continued observation.

## 2022-07-30 ENCOUNTER — TELEPHONE ENCOUNTER (OUTPATIENT)
Age: 67
End: 2022-07-30

## 2022-07-31 ENCOUNTER — TELEPHONE ENCOUNTER (OUTPATIENT)
Age: 67
End: 2022-07-31

## 2022-08-22 NOTE — PROCEDURE: COUNSELING
Addended by: RAZA GUPTA on: 8/22/2022 08:10 AM     Modules accepted: Orders    
Detail Level: Detailed
Detail Level: Simple

## 2022-09-21 NOTE — PATIENT DISCUSSION
Patient states tearing has largely resolved following dilation and irrigation two months ago. Follow up if symptoms recur.

## 2022-11-29 ENCOUNTER — FOLLOW UP (OUTPATIENT)
Dept: URBAN - METROPOLITAN AREA CLINIC 26 | Facility: CLINIC | Age: 67
End: 2022-11-29

## 2022-11-29 DIAGNOSIS — H53.412: ICD-10-CM

## 2022-11-29 DIAGNOSIS — H33.301: ICD-10-CM

## 2022-11-29 DIAGNOSIS — H18.599: ICD-10-CM

## 2022-11-29 DIAGNOSIS — H35.341: ICD-10-CM

## 2022-11-29 DIAGNOSIS — H35.3231: ICD-10-CM

## 2022-11-29 DIAGNOSIS — H43.392: ICD-10-CM

## 2022-11-29 DIAGNOSIS — H43.813: ICD-10-CM

## 2022-11-29 DIAGNOSIS — H04.123: ICD-10-CM

## 2022-11-29 DIAGNOSIS — H35.372: ICD-10-CM

## 2022-11-29 PROCEDURE — 92250 FUNDUS PHOTOGRAPHY W/I&R: CPT

## 2022-11-29 PROCEDURE — 92134 CPTRZ OPH DX IMG PST SGM RTA: CPT

## 2022-11-29 PROCEDURE — 92083 EXTENDED VISUAL FIELD XM: CPT

## 2022-11-29 PROCEDURE — 92014 COMPRE OPH EXAM EST PT 1/>: CPT

## 2022-11-29 ASSESSMENT — VISUAL ACUITY
OD_CC: 20/25+2
OS_CC: 20/20-1

## 2022-11-29 ASSESSMENT — TONOMETRY
OS_IOP_MMHG: 19
OD_IOP_MMHG: 17

## 2023-05-01 ENCOUNTER — OFFICE VISIT (OUTPATIENT)
Dept: URBAN - METROPOLITAN AREA CLINIC 60 | Facility: CLINIC | Age: 68
End: 2023-05-01

## 2023-05-11 ENCOUNTER — DASHBOARD ENCOUNTERS (OUTPATIENT)
Age: 68
End: 2023-05-11

## 2023-05-11 ENCOUNTER — OFFICE VISIT (OUTPATIENT)
Dept: URBAN - METROPOLITAN AREA CLINIC 63 | Facility: CLINIC | Age: 68
End: 2023-05-11
Payer: MEDICARE

## 2023-05-11 ENCOUNTER — WEB ENCOUNTER (OUTPATIENT)
Dept: URBAN - METROPOLITAN AREA CLINIC 63 | Facility: CLINIC | Age: 68
End: 2023-05-11

## 2023-05-11 VITALS
BODY MASS INDEX: 27.05 KG/M2 | DIASTOLIC BLOOD PRESSURE: 70 MMHG | SYSTOLIC BLOOD PRESSURE: 120 MMHG | OXYGEN SATURATION: 96 % | WEIGHT: 147 LBS | HEIGHT: 62 IN | TEMPERATURE: 97.2 F | HEART RATE: 62 BPM

## 2023-05-11 DIAGNOSIS — R19.5 ABNORMAL CONSISTENCY OF STOOL: ICD-10-CM

## 2023-05-11 PROCEDURE — 99202 OFFICE O/P NEW SF 15 MIN: CPT | Performed by: PHYSICIAN ASSISTANT

## 2023-05-11 RX ORDER — MULTIVITAMIN
1 TABLET TABLET ORAL ONCE A DAY
Status: ACTIVE | COMMUNITY

## 2023-05-11 RX ORDER — LEVOTHYROXINE SODIUM 100 UG/1
1 TABLET IN THE MORNING ON AN EMPTY STOMACH TABLET ORAL ONCE A DAY
Status: ACTIVE | COMMUNITY

## 2023-05-11 RX ORDER — ATORVASTATIN CALCIUM 20 MG/1
1 TABLET TABLET, FILM COATED ORAL ONCE A DAY
Status: ACTIVE | COMMUNITY

## 2023-05-11 RX ORDER — POLYETHYLENE GLYCOL 3350, SODIUM CHLORIDE, SODIUM BICARBONATE, POTASSIUM CHLORIDE 420; 11.2; 5.72; 1.48 G/4L; G/4L; G/4L; G/4L
AS DIRECTED POWDER, FOR SOLUTION ORAL ONCE
Qty: 4000 | Refills: 0 | OUTPATIENT
Start: 2023-05-11 | End: 2023-05-12

## 2023-05-11 NOTE — HPI-HPI
Barbara is a 67 year old female with past medical history of hyperlipidemia, hypothyroidism, referred by her PCP. She had recent positive fecal occult blood test. Labwork on 4/19/23: Hgb 13.8/ Hct 43, Liver tests WNL. Hgb A1C 5.5. Her last colonoscopy was performed over 7 years ago. She has no family history of colon cancer. Denies any constipation, diarrhea, hematochezia or melena. Has one BM daily. Denies history of MI/stroke, does not have cardiologist. Denies history of COPD, asthma, or sleep apnea. She does not smoke or use inhalers.

## 2023-05-18 ENCOUNTER — LAB OUTSIDE AN ENCOUNTER (OUTPATIENT)
Dept: URBAN - METROPOLITAN AREA CLINIC 63 | Facility: CLINIC | Age: 68
End: 2023-05-18

## 2023-05-23 ENCOUNTER — COMPREHENSIVE EXAM (OUTPATIENT)
Dept: URBAN - METROPOLITAN AREA CLINIC 26 | Facility: CLINIC | Age: 68
End: 2023-05-23

## 2023-05-23 VITALS — WEIGHT: 145 LBS | HEIGHT: 62 IN | BODY MASS INDEX: 26.68 KG/M2

## 2023-05-23 DIAGNOSIS — H35.3231: ICD-10-CM

## 2023-05-23 DIAGNOSIS — H18.599: ICD-10-CM

## 2023-05-23 DIAGNOSIS — H43.813: ICD-10-CM

## 2023-05-23 DIAGNOSIS — H53.433: ICD-10-CM

## 2023-05-23 DIAGNOSIS — H35.372: ICD-10-CM

## 2023-05-23 DIAGNOSIS — H04.123: ICD-10-CM

## 2023-05-23 DIAGNOSIS — H35.341: ICD-10-CM

## 2023-05-23 DIAGNOSIS — H43.392: ICD-10-CM

## 2023-05-23 DIAGNOSIS — H33.301: ICD-10-CM

## 2023-05-23 PROCEDURE — 92014 COMPRE OPH EXAM EST PT 1/>: CPT

## 2023-05-23 PROCEDURE — 92083 EXTENDED VISUAL FIELD XM: CPT

## 2023-05-23 PROCEDURE — 92134 CPTRZ OPH DX IMG PST SGM RTA: CPT

## 2023-05-23 PROCEDURE — 92250 FUNDUS PHOTOGRAPHY W/I&R: CPT

## 2023-05-23 ASSESSMENT — TONOMETRY
OS_IOP_MMHG: 14
OD_IOP_MMHG: 13

## 2023-05-23 ASSESSMENT — VISUAL ACUITY
OS_CC: 20/20
OD_CC: 20/20-1

## 2023-05-25 ENCOUNTER — CLAIMS CREATED FROM THE CLAIM WINDOW (OUTPATIENT)
Dept: URBAN - METROPOLITAN AREA SURGERY CENTER 4 | Facility: SURGERY CENTER | Age: 68
End: 2023-05-25
Payer: MEDICARE

## 2023-05-25 ENCOUNTER — CLAIMS CREATED FROM THE CLAIM WINDOW (OUTPATIENT)
Dept: URBAN - METROPOLITAN AREA CLINIC 4 | Facility: CLINIC | Age: 68
End: 2023-05-25
Payer: MEDICARE

## 2023-05-25 DIAGNOSIS — Z12.11 COLON CANCER SCREENING: ICD-10-CM

## 2023-05-25 DIAGNOSIS — K57.30 DIVERTCULOSIS OF LG INT W/O PERFORATION OR ABSCESS W/O BLEEDING: ICD-10-CM

## 2023-05-25 DIAGNOSIS — K64.0 GRADE I HEMORRHOIDS: ICD-10-CM

## 2023-05-25 DIAGNOSIS — D12.4 ADENOMATOUS POLYP OF DESCENDING COLON: ICD-10-CM

## 2023-05-25 DIAGNOSIS — K63.89 OTHER SPECIFIED DISEASES OF INTESTINE: ICD-10-CM

## 2023-05-25 PROCEDURE — 45380 COLONOSCOPY AND BIOPSY: CPT | Performed by: INTERNAL MEDICINE

## 2023-05-25 PROCEDURE — 45380 COLONOSCOPY AND BIOPSY: CPT | Performed by: CLINIC/CENTER

## 2023-05-25 PROCEDURE — 88305 TISSUE EXAM BY PATHOLOGIST: CPT | Performed by: PATHOLOGY

## 2023-05-25 RX ORDER — ATORVASTATIN CALCIUM 20 MG/1
1 TABLET TABLET, FILM COATED ORAL ONCE A DAY
Status: ACTIVE | COMMUNITY

## 2023-05-25 RX ORDER — MULTIVITAMIN
1 TABLET TABLET ORAL ONCE A DAY
Status: ACTIVE | COMMUNITY

## 2023-05-25 RX ORDER — LEVOTHYROXINE SODIUM 100 UG/1
1 TABLET IN THE MORNING ON AN EMPTY STOMACH TABLET ORAL ONCE A DAY
Status: ACTIVE | COMMUNITY

## 2023-11-27 ENCOUNTER — FOLLOW UP (OUTPATIENT)
Dept: URBAN - METROPOLITAN AREA CLINIC 26 | Facility: CLINIC | Age: 68
End: 2023-11-27

## 2023-11-27 DIAGNOSIS — H43.813: ICD-10-CM

## 2023-11-27 DIAGNOSIS — H33.301: ICD-10-CM

## 2023-11-27 DIAGNOSIS — H04.123: ICD-10-CM

## 2023-11-27 DIAGNOSIS — H35.341: ICD-10-CM

## 2023-11-27 DIAGNOSIS — H43.392: ICD-10-CM

## 2023-11-27 DIAGNOSIS — H35.3231: ICD-10-CM

## 2023-11-27 DIAGNOSIS — H18.599: ICD-10-CM

## 2023-11-27 DIAGNOSIS — H53.433: ICD-10-CM

## 2023-11-27 DIAGNOSIS — H35.372: ICD-10-CM

## 2023-11-27 PROCEDURE — 92134 CPTRZ OPH DX IMG PST SGM RTA: CPT

## 2023-11-27 PROCEDURE — 92250 FUNDUS PHOTOGRAPHY W/I&R: CPT

## 2023-11-27 PROCEDURE — 92014 COMPRE OPH EXAM EST PT 1/>: CPT

## 2023-11-27 PROCEDURE — 92083 EXTENDED VISUAL FIELD XM: CPT

## 2023-11-27 ASSESSMENT — VISUAL ACUITY
OS_CC: 20/20-2
OD_CC: 20/25+1

## 2023-11-27 ASSESSMENT — TONOMETRY
OS_IOP_MMHG: 17
OD_IOP_MMHG: 18

## 2024-04-30 ENCOUNTER — COMPREHENSIVE EXAM (OUTPATIENT)
Dept: URBAN - METROPOLITAN AREA CLINIC 26 | Facility: CLINIC | Age: 69
End: 2024-04-30

## 2024-04-30 VITALS — BODY MASS INDEX: 26.13 KG/M2 | HEIGHT: 62 IN | WEIGHT: 142 LBS

## 2024-04-30 DIAGNOSIS — H43.392: ICD-10-CM

## 2024-04-30 DIAGNOSIS — H35.372: ICD-10-CM

## 2024-04-30 DIAGNOSIS — H18.599: ICD-10-CM

## 2024-04-30 DIAGNOSIS — H33.301: ICD-10-CM

## 2024-04-30 DIAGNOSIS — H04.123: ICD-10-CM

## 2024-04-30 DIAGNOSIS — H44.22: ICD-10-CM

## 2024-04-30 DIAGNOSIS — H43.813: ICD-10-CM

## 2024-04-30 DIAGNOSIS — H53.433: ICD-10-CM

## 2024-04-30 DIAGNOSIS — H02.833: ICD-10-CM

## 2024-04-30 DIAGNOSIS — H35.3231: ICD-10-CM

## 2024-04-30 DIAGNOSIS — H01.003: ICD-10-CM

## 2024-04-30 DIAGNOSIS — H35.341: ICD-10-CM

## 2024-04-30 DIAGNOSIS — H01.006: ICD-10-CM

## 2024-04-30 DIAGNOSIS — H02.836: ICD-10-CM

## 2024-04-30 PROCEDURE — 92014 COMPRE OPH EXAM EST PT 1/>: CPT

## 2024-04-30 PROCEDURE — 92083 EXTENDED VISUAL FIELD XM: CPT

## 2024-04-30 PROCEDURE — 92250 FUNDUS PHOTOGRAPHY W/I&R: CPT | Mod: 59

## 2024-04-30 PROCEDURE — 92134 CPTRZ OPH DX IMG PST SGM RTA: CPT

## 2024-04-30 ASSESSMENT — VISUAL ACUITY
OD_CC: 20/25+2
OS_CC: 20/20

## 2024-04-30 ASSESSMENT — TONOMETRY
OD_IOP_MMHG: 14
OS_IOP_MMHG: 15

## 2024-11-19 NOTE — PATIENT DISCUSSION
Notify Provider: \"Have you been to the ER, urgent care clinic since your last visit?  Hospitalized since your last visit?\"    NO    “Have you seen or consulted any other health care providers outside our system since your last visit?”    NO    “Have you had a colorectal cancer screening such as a colonoscopy/FIT/Cologuard?    YES - Type: Colonoscopy - Where: [facility] Nurse/CMA to request most recent records if not in the chart     No colonoscopy on file  No cologuard on file  No FIT/FOBT on file   No flexible sigmoidoscopy on file

## 2024-12-17 ENCOUNTER — COMPREHENSIVE EXAM (OUTPATIENT)
Age: 69
End: 2024-12-17

## 2024-12-17 DIAGNOSIS — H35.341: ICD-10-CM

## 2024-12-17 DIAGNOSIS — H33.301: ICD-10-CM

## 2024-12-17 DIAGNOSIS — H53.433: ICD-10-CM

## 2024-12-17 DIAGNOSIS — H44.22: ICD-10-CM

## 2024-12-17 DIAGNOSIS — H04.123: ICD-10-CM

## 2024-12-17 DIAGNOSIS — H18.599: ICD-10-CM

## 2024-12-17 DIAGNOSIS — H01.003: ICD-10-CM

## 2024-12-17 DIAGNOSIS — H35.3231: ICD-10-CM

## 2024-12-17 DIAGNOSIS — H01.006: ICD-10-CM

## 2024-12-17 DIAGNOSIS — H35.372: ICD-10-CM

## 2024-12-17 DIAGNOSIS — H02.836: ICD-10-CM

## 2024-12-17 DIAGNOSIS — H43.813: ICD-10-CM

## 2024-12-17 DIAGNOSIS — H43.392: ICD-10-CM

## 2024-12-17 DIAGNOSIS — H02.833: ICD-10-CM

## 2024-12-17 PROCEDURE — 92134 CPTRZ OPH DX IMG PST SGM RTA: CPT

## 2024-12-17 PROCEDURE — 92014 COMPRE OPH EXAM EST PT 1/>: CPT

## 2024-12-17 PROCEDURE — 92083 EXTENDED VISUAL FIELD XM: CPT

## 2024-12-17 PROCEDURE — 92250 FUNDUS PHOTOGRAPHY W/I&R: CPT | Mod: 59

## 2025-01-08 NOTE — PATIENT DISCUSSION
PHOTOGRAPHS: I have reviewed the external ocular photographs of this patient which show the following: significant ptosis of both upper eyelids. Negative

## 2025-05-19 ENCOUNTER — COMPREHENSIVE EXAM (OUTPATIENT)
Age: 70
End: 2025-05-19

## 2025-05-19 VITALS — BODY MASS INDEX: 27.05 KG/M2 | WEIGHT: 147 LBS | HEIGHT: 62 IN

## 2025-05-19 DIAGNOSIS — H04.123: ICD-10-CM

## 2025-05-19 DIAGNOSIS — H01.003: ICD-10-CM

## 2025-05-19 DIAGNOSIS — H44.22: ICD-10-CM

## 2025-05-19 DIAGNOSIS — H01.006: ICD-10-CM

## 2025-05-19 DIAGNOSIS — H35.3231: ICD-10-CM

## 2025-05-19 DIAGNOSIS — H53.433: ICD-10-CM

## 2025-05-19 DIAGNOSIS — H43.813: ICD-10-CM

## 2025-05-19 DIAGNOSIS — H02.833: ICD-10-CM

## 2025-05-19 DIAGNOSIS — H43.392: ICD-10-CM

## 2025-05-19 DIAGNOSIS — H33.301: ICD-10-CM

## 2025-05-19 DIAGNOSIS — H02.836: ICD-10-CM

## 2025-05-19 DIAGNOSIS — H18.599: ICD-10-CM

## 2025-05-19 DIAGNOSIS — H35.372: ICD-10-CM

## 2025-05-19 DIAGNOSIS — H35.341: ICD-10-CM

## 2025-05-19 PROCEDURE — 92083 EXTENDED VISUAL FIELD XM: CPT

## 2025-05-19 PROCEDURE — 92014 COMPRE OPH EXAM EST PT 1/>: CPT

## 2025-05-19 PROCEDURE — 92250 FUNDUS PHOTOGRAPHY W/I&R: CPT | Mod: 59

## 2025-05-19 PROCEDURE — 92134 CPTRZ OPH DX IMG PST SGM RTA: CPT
